# Patient Record
(demographics unavailable — no encounter records)

---

## 2024-10-10 NOTE — ASSESSMENT
[FreeTextEntry1] : Right small trigger finger  Plan anti-inflammatories with GI precautions he can try Voltaren gel or cream I did discuss with about a 2nd cortisone injection which he would like to try.  Under sterile conditions a 2nd cortisone injection administered to the right small finger A1 pulley region.  He tolerated procedure well.  He will follow-up if symptoms continue and if it recurs to be a candidate for surgery.  He was advised to watch his sugars.  I did advise him if the cortisone injection does not work or if symptoms recur or any continues to have locking clicking he would require surgery.  I did advise him I would get him over to one of the operative hand surgeons if he needed the procedure.  All questions were answered is agreeable with the plan.  Patient was advised if they develop any severe pain, numbness or tingling or pain with range of motion to the fingers they must call or go to the emergency room immediately. All the signs and symptoms of compartment syndrome were explained.  The patient understands.  This medical record was created utilizing Dragon voice recognition software.  This software is not perfect and may occasionally create typographical errors which may be reflected in the above medical record.

## 2024-10-10 NOTE — HISTORY OF PRESENT ILLNESS
[de-identified] : Patient is 56-year-old right-hand-dominant male presents with a couple month history of pain and locking clicking in his right small finger.  He had trigger fingers in the past and the locking clicking is worse in the morning.  He does have a history of diabetes. He has not had any prior treatment.  October 10, 2024.  Patient presents for follow-up for his right small trigger finger.  He was given a cortisone injection back in July. He does state the cortisone injection did help significantly and he started getting symptoms again about 2 to 3 weeks ago.  He has had other trigger fingers that have responded to cortisone in the always require 2 injections.

## 2024-10-10 NOTE — IMAGING
[Right] : right hand [de-identified] : He is alert and oriented vital signs are stable.  Examination of right small finger reveals tenderness around the A1 pulley region with evidence for mild locking and some clicking.  The flexors and extensors are intact.  He does have mild flexor tenosynovitis.  Has a normal nail exam. He is able to make a fist.   He has full range of motion of the wrist. He has full range of motion of the elbow.  He has a normal neurologic exam. Normal vascular exam. Normal skin exam. No evidence for open wounds infection or compartment syndrome. [FreeTextEntry1] : X-rays right hand 3 views revealed no fracture or dislocations.

## 2024-10-10 NOTE — PROCEDURE
[Tendon Sheath] : tendon sheath [Right] : of the right [5th] : 5th trigger finger [Pain] : pain [Inflammation] : inflammation [Alcohol] : alcohol [Betadine] : betadine [Ethyl Chloride sprayed topically] : ethyl chloride sprayed topically [Sterile technique used] : sterile technique used [___ cc    6mg] :  Betamethasone (Celestone) ~Vcc of 6mg [___ cc    1%] : Lidocaine ~Vcc of 1%  [] : Patient tolerated procedure well [Call if redness, pain or fever occur] : call if redness, pain or fever occur [Apply ice for 15min out of every hour for the next 12-24 hours as tolerated] : apply ice for 15 minutes out of every hour for the next 12-24 hours as tolerated [Risks, benefits, alternatives discussed / Verbal consent obtained] : the risks benefits, and alternatives have been discussed, and verbal consent was obtained [FreeTextEntry3] : Risks, benefits, and alternatives of the cortisone injection were explained to the patient prior to procedure. Risk included to but not limited to increase in sugars if the patient has diabetes and they must check and monitor their sugars, infection, bleeding, vasovagal episode, injury to the nerves, pain at injection site, swelling, loss ROM, hypopigmentation at injection site, fat atrophy, facial flushing and if any facial flushing occurs, they were advised to take Benadryl OTC. Ice to injection site as needed 20 minutes on and off. Patient understands all risks and agrees to the injection with verbal consent. Patient tolerated the procedure well.

## 2024-12-11 NOTE — PHYSICAL EXAM
[Well Developed] : well developed [Well Nourished] : well nourished [No Acute Distress] : no acute distress [Normal Conjunctiva] : normal conjunctiva [Normal Venous Pressure] : normal venous pressure [No Carotid Bruit] : no carotid bruit [Clear Lung Fields] : clear lung fields [Good Air Entry] : good air entry [No Respiratory Distress] : no respiratory distress  [Soft] : abdomen soft [Non Tender] : non-tender [No Masses/organomegaly] : no masses/organomegaly [Normal Bowel Sounds] : normal bowel sounds [Normal Gait] : normal gait [No Edema] : no edema [No Cyanosis] : no cyanosis [No Clubbing] : no clubbing [No Varicosities] : no varicosities [No Rash] : no rash [No Skin Lesions] : no skin lesions [Moves all extremities] : moves all extremities [No Focal Deficits] : no focal deficits [Normal Speech] : normal speech [Alert and Oriented] : alert and oriented [Normal memory] : normal memory [de-identified] : Regular rate and rhythm, NL S1, S2, non-displaced PMI, chest non-tender; no rubs,heaves  or gallops a  Grade 2/6 systolic murmur noted at the LSB

## 2024-12-11 NOTE — PHYSICAL EXAM
[Well Developed] : well developed [Well Nourished] : well nourished [No Acute Distress] : no acute distress [Normal Conjunctiva] : normal conjunctiva [Normal Venous Pressure] : normal venous pressure [No Carotid Bruit] : no carotid bruit [Clear Lung Fields] : clear lung fields [Good Air Entry] : good air entry [No Respiratory Distress] : no respiratory distress  [Soft] : abdomen soft [Non Tender] : non-tender [No Masses/organomegaly] : no masses/organomegaly [Normal Bowel Sounds] : normal bowel sounds [Normal Gait] : normal gait [No Edema] : no edema [No Cyanosis] : no cyanosis [No Clubbing] : no clubbing [No Varicosities] : no varicosities [No Rash] : no rash [No Skin Lesions] : no skin lesions [Moves all extremities] : moves all extremities [No Focal Deficits] : no focal deficits [Normal Speech] : normal speech [Alert and Oriented] : alert and oriented [Normal memory] : normal memory [de-identified] : Regular rate and rhythm, NL S1, S2, non-displaced PMI, chest non-tender; no rubs,heaves  or gallops a  Grade 2/6 systolic murmur noted at the LSB

## 2024-12-11 NOTE — HISTORY OF PRESENT ILLNESS
[FreeTextEntry1] : SHIRLEY is a 56 year M w/MHx of [place] [id number], Carotid disease, HLD, T2DM who presents for follow up.  The patient presents for routine follow up of their coronary artery disease. The patient has been following all secondary prevents measures and antiplatelet therapy. The patient denies shortness of breath, chest pain, dizziness, palpitations, easy bruising/bleeding/hematuria/blood in stool.   06/2024-  (pr 170 in 2019) 07/03/2024-  STN Normal myocardial perfusion scan, with no evidence of infarction or inducible ischemia.  The patient is here for follow-up of elevated cholesterol. Currently tolerating prescribed medications. Denies muscle pain, joint pain, back pain, urinary changes, nausea, vomiting, abdominal pain or diarrhea. The patient is trying to follow a low cholesterol diet. The patient also presents for continued care of diabetes mellitus. Patient is following recommended diabetic regimen. Tolerating hypoglycemic agents and diet recommendations. Denies visual changes, confusion, palpitations, tremors, diaphoresis, blood in the urine, abdominal pain, nausea, vomiting, myalgias, arthralgias, paresthesias and syncope. Too denies any chest discomfort, shortness of breath or new neurologic signs or symptoms. Also denies any polyuria, worsening nocturia, or polydipsia. Currently on Ozempic 1mg, following with Dr. Elmer Hi.   Pt declined flu vaccine.

## 2024-12-11 NOTE — HISTORY OF PRESENT ILLNESS
[FreeTextEntry1] : SHIRLEY is a 56 year M w/MHx of [place] [id number], Carotid disease, HLD, T2DM who presents for follow up.  The patient presents for routine follow up of their coronary artery disease. The patient has been following all secondary prevents measures and antiplatelet therapy. The patient denies shortness of breath, chest pain, dizziness, palpitations, easy bruising/bleeding/hematuria/blood in stool.   06/2024-  (pr 170 in 2019) 07/03/2024-  STN Normal myocardial perfusion scan, with no evidence of infarction or inducible ischemia.  The patient is here for follow-up of elevated cholesterol. Currently tolerating prescribed medications. Denies muscle pain, joint pain, back pain, urinary changes, nausea, vomiting, abdominal pain or diarrhea. The patient is trying to follow a low cholesterol diet. The patient also presents for continued care of diabetes mellitus. Patient is following recommended diabetic regimen. Tolerating hypoglycemic agents and diet recommendations. Denies visual changes, confusion, palpitations, tremors, diaphoresis, blood in the urine, abdominal pain, nausea, vomiting, myalgias, arthralgias, paresthesias and syncope. Too denies any chest discomfort, shortness of breath or new neurologic signs or symptoms. Also denies any polyuria, worsening nocturia, or polydipsia. Currently on Ozempic 1mg, following with Dr. Elmer Hi.   Pt declined flu vaccine.

## 2025-04-02 NOTE — HISTORY OF PRESENT ILLNESS
[FreeTextEntry1] : SHIRLEY is a 56 year M w/MHx of CAD [place] [address], Carotid disease, HLD, T2DM who presents for follow up. The patient denies fever, chills, sore throat, loss of taste or smell, muscle aches weight loss, malaise, rash, recent travel, insect bites, alteration bowel habits, headaches, weakness, abdominal  pain, bloating, changes in urination, visual disturbances, shortness of breath, chest pain, dizziness, palpitations.  The patient presents for routine follow up of their coronary artery disease.   The patient has been following all secondary prevents measures and antiplatelet therapy. The patient denies shortness of breath, chest pain, dizziness, palpitations.  The patient is here for follow-up of elevated cholesterol. Patient is currently tolerating medication and denies muscle pain, joint pain, back pain,  urinary changes , nausea, vomiting, abdominal pain or diarrhea. The patient is trying to follow a low cholesterol diet.  The patient also presents for continued care of diabetes mellitus. Patient is following the diabetic regimen. Patient is tolerating hypoglycemic agents and following the diet. Patient denies any visual changes, blood in the urine, abdominal pain, nausea, vomiting, myalgias, arthralgias, paresthesias and syncope. The patient denies any chest discomfort, shortness of breath or new neurologic signs or symptoms. Patient denies any polyuria, worsening nocturia, or polydipsia.